# Patient Record
Sex: MALE | Race: WHITE | Employment: FULL TIME | URBAN - NONMETROPOLITAN AREA
[De-identification: names, ages, dates, MRNs, and addresses within clinical notes are randomized per-mention and may not be internally consistent; named-entity substitution may affect disease eponyms.]

---

## 2020-05-24 ENCOUNTER — HOSPITAL ENCOUNTER (EMERGENCY)
Facility: HOSPITAL | Age: 25
Discharge: HOME | End: 2020-05-24
Attending: EMERGENCY MEDICINE | Admitting: EMERGENCY MEDICINE
Payer: MEDICAID

## 2020-05-24 VITALS
SYSTOLIC BLOOD PRESSURE: 138 MMHG | TEMPERATURE: 97.6 F | RESPIRATION RATE: 20 BRPM | DIASTOLIC BLOOD PRESSURE: 94 MMHG | OXYGEN SATURATION: 98 %

## 2020-05-24 DIAGNOSIS — K08.89 PAIN, DENTAL: ICD-10-CM

## 2020-05-24 DIAGNOSIS — K02.9 DENTAL CARIES: ICD-10-CM

## 2020-05-24 DIAGNOSIS — K05.01 GINGIVITIS, ACUTE, NON-PLAQUE INDUCED: ICD-10-CM

## 2020-05-24 PROCEDURE — 25000125 ZZHC RX 250: Performed by: EMERGENCY MEDICINE

## 2020-05-24 PROCEDURE — 64400 NJX AA&/STRD TRIGEMINAL NRV: CPT | Performed by: EMERGENCY MEDICINE

## 2020-05-24 PROCEDURE — 99282 EMERGENCY DEPT VISIT SF MDM: CPT | Mod: 25 | Performed by: EMERGENCY MEDICINE

## 2020-05-24 PROCEDURE — 64400 NJX AA&/STRD TRIGEMINAL NRV: CPT

## 2020-05-24 PROCEDURE — 99283 EMERGENCY DEPT VISIT LOW MDM: CPT | Mod: 25

## 2020-05-24 PROCEDURE — 25000132 ZZH RX MED GY IP 250 OP 250 PS 637: Performed by: EMERGENCY MEDICINE

## 2020-05-24 PROCEDURE — 25000128 H RX IP 250 OP 636: Performed by: EMERGENCY MEDICINE

## 2020-05-24 RX ORDER — GABAPENTIN 300 MG/1
300 CAPSULE ORAL ONCE
Status: COMPLETED | OUTPATIENT
Start: 2020-05-24 | End: 2020-05-24

## 2020-05-24 RX ORDER — IBUPROFEN 600 MG/1
600 TABLET, FILM COATED ORAL EVERY 8 HOURS PRN
Qty: 60 TABLET | Refills: 0 | Status: SHIPPED | OUTPATIENT
Start: 2020-05-24 | End: 2020-06-13

## 2020-05-24 RX ORDER — IBUPROFEN 600 MG/1
600 TABLET, FILM COATED ORAL ONCE
Status: COMPLETED | OUTPATIENT
Start: 2020-05-24 | End: 2020-05-24

## 2020-05-24 RX ORDER — GABAPENTIN 300 MG/1
300 CAPSULE ORAL 3 TIMES DAILY
Qty: 30 CAPSULE | Refills: 0 | Status: SHIPPED | OUTPATIENT
Start: 2020-05-24 | End: 2020-06-13

## 2020-05-24 RX ORDER — METHYLCELLULOSE 4000CPS 30 %
POWDER (GRAM) MISCELLANEOUS ONCE
Status: DISCONTINUED | OUTPATIENT
Start: 2020-05-24 | End: 2020-05-24 | Stop reason: HOSPADM

## 2020-05-24 RX ORDER — BUPIVACAINE HYDROCHLORIDE AND EPINEPHRINE 2.5; 5 MG/ML; UG/ML
10 INJECTION, SOLUTION INFILTRATION; PERINEURAL ONCE
Status: DISCONTINUED | OUTPATIENT
Start: 2020-05-24 | End: 2020-05-24 | Stop reason: HOSPADM

## 2020-05-24 RX ORDER — AMOXICILLIN 875 MG
875 TABLET ORAL 2 TIMES DAILY
Qty: 14 TABLET | Refills: 0 | Status: SHIPPED | OUTPATIENT
Start: 2020-05-24 | End: 2020-06-13

## 2020-05-24 RX ADMIN — AMOXICILLIN AND CLAVULANATE POTASSIUM 1 TABLET: 875; 125 TABLET, FILM COATED ORAL at 02:24

## 2020-05-24 RX ADMIN — IBUPROFEN 600 MG: 600 TABLET ORAL at 02:23

## 2020-05-24 RX ADMIN — GABAPENTIN 300 MG: 300 CAPSULE ORAL at 02:24

## 2020-05-24 RX ADMIN — EPINEPHRINE BITARTRATE 5 ML: 1 POWDER at 02:23

## 2020-05-24 ASSESSMENT — ENCOUNTER SYMPTOMS
NAUSEA: 0
SORE THROAT: 0
SHORTNESS OF BREATH: 0
SINUS PAIN: 0
BRUISES/BLEEDS EASILY: 0
FEVER: 0
ADENOPATHY: 0
CHOKING: 0
FACIAL SWELLING: 1
VOICE CHANGE: 0
CHILLS: 0
TROUBLE SWALLOWING: 0

## 2020-05-24 NOTE — DISCHARGE INSTRUCTIONS
Followup with a dentist ASAP  and also get a PCP.  You can also call the [facility] at    To get a pcp  Take motrin with food.

## 2020-05-24 NOTE — ED TRIAGE NOTES
"Comes  To triage holding his right lower jaw with both hands and moaning. State he is having an issue with an incoming wisdom tooth pushing against \"my last molar\".  "

## 2020-05-24 NOTE — ED AVS SNAPSHOT
[facility]  [address]  [place] [address]  Phone:  [phone]                                    Eugene Fragoso   MRN: [id number]    Department:  [facility]   Date of Visit:  5/24/2020           After Visit Summary Signature Page    I have received my discharge instructions, and my questions have been answered. I have discussed any challenges I see with this plan with the nurse or doctor.    ..........................................................................................................................................  Patient/Patient Representative Signature      ..........................................................................................................................................  Patient Representative Print Name and Relationship to Patient    ..................................................               ................................................  Date                                   Time    ..........................................................................................................................................  Reviewed by Signature/Title    ...................................................              ..............................................  Date                                               Time          22EPIC Rev 08/18

## 2020-05-24 NOTE — ED PROVIDER NOTES
"  History     Chief Complaint   Patient presents with     Dental Pain     HPI  Eugene Fragoso is a 25 year old male who has a two day history of dental pain on the lower right side of his mouth. He thinks his wisdome tooth is coming in and pushing on his second molar on that side. He  Has not had this before. Cold things make it worse. He has not taken any other med than tylenol. He does smoke, denies  Drugs of abuse, but says he is \" not good about taking care of my teeth\"  He is not diabetic and  Has no fever but pos pain on chewing     Allergies:  No Known Allergies    Problem List:    There are no active problems to display for this patient.       Past Medical History:    History reviewed. No pertinent past medical history.    Past Surgical History:    History reviewed. No pertinent surgical history.    Family History:    No family history on file.    Social History:  Marital Status:  Single [1]  Social History     Tobacco Use     Smoking status: None   Substance Use Topics     Alcohol use: None     Drug use: None        Medications:    amoxicillin (AMOXIL) 875 MG tablet  gabapentin (NEURONTIN) 300 MG capsule  ibuprofen (ADVIL/MOTRIN) 600 MG tablet          Review of Systems   Constitutional: Negative for chills and fever.   HENT: Positive for facial swelling. Negative for hearing loss, nosebleeds, sinus pain, sore throat, trouble swallowing and voice change.    Respiratory: Negative for choking and shortness of breath.    Gastrointestinal: Negative for nausea.   Allergic/Immunologic: Negative for environmental allergies and immunocompromised state.   Hematological: Negative for adenopathy. Does not bruise/bleed easily.       Physical Exam   BP: 138/94  Heart Rate: 87  Temp: 97.6  F (36.4  C)  Resp: 20  SpO2: 98 %      Physical Exam  Constitutional:       General: He is not in acute distress.     Appearance: He is not toxic-appearing or diaphoretic.   HENT:      Head: Normocephalic and atraumatic.      " Right Ear: Ear canal normal.      Left Ear: Ear canal normal.      Nose: Nose normal.      Mouth/Throat:      Mouth: Mucous membranes are moist.      Pharynx: No oropharyngeal exudate or posterior oropharyngeal erythema.      Comments: Patient has tenderness on the lower angle of the jaw but not at the TMJ.  He can open his mouth fully.  There is some redness to the gingiva but no palpable pus.  He is little bit tender in the pterygoid fossa area.  His second molar has been temp on the posterior surface.  He is missing the first molar and several other teeth on the lower surfaces and several on the upper surfaces of his dental array.  He has multiple fillings in.  There are no cracked fillings or other evulsed teeth.  Odor on his breath is not of alcohol or ketones.  Eyes:      General:         Right eye: No discharge.         Left eye: No discharge.      Extraocular Movements: Extraocular movements intact.      Pupils: Pupils are equal, round, and reactive to light.   Neck:      Musculoskeletal: No neck rigidity or muscular tenderness.      Vascular: No carotid bruit.   Cardiovascular:      Rate and Rhythm: Normal rate and regular rhythm.      Heart sounds: Normal heart sounds.   Pulmonary:      Effort: No respiratory distress.      Breath sounds: Normal breath sounds. No wheezing or rales.   Chest:      Chest wall: No tenderness.   Abdominal:      General: Bowel sounds are normal.      Palpations: Abdomen is soft.      Tenderness: There is no abdominal tenderness.   Musculoskeletal: Normal range of motion.         General: No tenderness.      Cervical back: He exhibits no tenderness.      Thoracic back: He exhibits no tenderness.      Lumbar back: He exhibits no tenderness.   Skin:     Findings: No abrasion or laceration.   Neurological:      General: No focal deficit present.      Mental Status: He is alert and oriented to person, place, and time.      Cranial Nerves: No cranial nerve deficit.      Motor: No  weakness.   Psychiatric:         Behavior: Behavior normal.         Thought Content: Thought content normal.         ED Course        [facility]    Dental Block    Date/Time: 5/24/2020 2:25 AM  Performed by: Latasha Rivera MD  Authorized by: Latasha Rivera MD       INDICATIONS     Indications: dental abscess and dental pain      LOCATION     Block type:  Inferior alveolar    Laterality:  Right    PROCEDURE DETAILS     Topical anesthetic:  Benzocaine gel and lidocaine gel    Needle gauge:  27 G    Anesthetic injected:  Bupivacaine 0.5% WITH epi    Injection procedure:  Anatomic landmarks identified, introduced needle, incremental injection, anatomic landmarks palpated and negative aspiration for blood    POST PROCEDURE DETAILS      Outcome:  Pain improved    PROCEDURE   Patient Tolerance:  Patient tolerated the procedure well with no immediate complications                     No results found for this or any previous visit (from the past 24 hour(s)).    Medications   bupivacaine 0.25 % - EPINEPHrine 1:200,000 injection 10 mL (has no administration in time range)   lidocaine/EPINEPHrine/tetracaine (LET) solution KIT (has no administration in time range)   methylcellulose powder (has no administration in time range)   ibuprofen (ADVIL/MOTRIN) tablet 600 mg (has no administration in time range)   amoxicillin-clavulanate (AUGMENTIN) 875-125 MG per tablet 1 tablet (has no administration in time range)   gabapentin (NEURONTIN) capsule 300 mg (has no administration in time range)       Assessments & Plan (with Medical Decision Making)     I have reviewed the nursing notes.    I have reviewed the findings, diagnosis, plan and need for follow up with the patient.      New Prescriptions    AMOXICILLIN (AMOXIL) 875 MG TABLET    Take 1 tablet (875 mg) by mouth 2 times daily    GABAPENTIN (NEURONTIN) 300 MG CAPSULE    Take 1 capsule (300 mg) by mouth 3 times daily    IBUPROFEN (ADVIL/MOTRIN) 600 MG TABLET     Take 1 tablet (600 mg) by mouth every 8 hours as needed for moderate pain       Final diagnoses:   Pain, dental   Dental caries   Gingivitis, acute, non-plaque induced       5/24/2020   [facility]     Latasha Rivera MD  05/24/20 5539

## 2020-06-13 ENCOUNTER — HOSPITAL ENCOUNTER (EMERGENCY)
Facility: HOSPITAL | Age: 25
Discharge: HOME | End: 2020-06-13
Attending: NURSE PRACTITIONER | Admitting: NURSE PRACTITIONER
Payer: MEDICAID

## 2020-06-13 VITALS
DIASTOLIC BLOOD PRESSURE: 90 MMHG | SYSTOLIC BLOOD PRESSURE: 138 MMHG | OXYGEN SATURATION: 99 % | RESPIRATION RATE: 14 BRPM | TEMPERATURE: 97.4 F

## 2020-06-13 DIAGNOSIS — K04.7 DENTAL ABSCESS: ICD-10-CM

## 2020-06-13 PROCEDURE — G0463 HOSPITAL OUTPT CLINIC VISIT: HCPCS

## 2020-06-13 PROCEDURE — 99203 OFFICE O/P NEW LOW 30 MIN: CPT | Mod: Z6 | Performed by: NURSE PRACTITIONER

## 2020-06-13 RX ORDER — CLINDAMYCIN HCL 150 MG
450 CAPSULE ORAL 3 TIMES DAILY
Qty: 63 CAPSULE | Refills: 0 | Status: SHIPPED | OUTPATIENT
Start: 2020-06-13 | End: 2020-06-20

## 2020-06-13 RX ORDER — CHLORHEXIDINE GLUCONATE ORAL RINSE 1.2 MG/ML
15 SOLUTION DENTAL 2 TIMES DAILY
Qty: 300 ML | Refills: 0 | Status: SHIPPED | OUTPATIENT
Start: 2020-06-13 | End: 2020-06-23

## 2020-06-13 ASSESSMENT — ENCOUNTER SYMPTOMS
FACIAL SWELLING: 1
LIGHT-HEADEDNESS: 0
HEADACHES: 1
DIZZINESS: 0
NECK STIFFNESS: 0
FEVER: 0
VOMITING: 0
EYES NEGATIVE: 1
CHILLS: 1
ACTIVITY CHANGE: 1
NECK PAIN: 0
NAUSEA: 0

## 2020-06-13 NOTE — DISCHARGE INSTRUCTIONS
"clindamycin as prescribed for infection.  Apply a cold pack or ice compress for up to 20 minutes several times a day. This is to help reduce pain and relieve swelling. Cover it with a thin, dry cloth before putting it on your skin.    Rinse your mouth with warm saltwater several times per day. This will help reduce irritation, gum swelling, and pain.  Good oral hygiene is imperitive. Brush your at least twice a day. Use a fluoride toothpaste and soft-bristle toothbrush.  Eat a healthy diet that doesn t include many sugary foods and drinks.  Call ASAP to schedule an appointment to see a dentist.   Our  department can try to assist you if you are having difficulty finding a dentist, dental coverage, or paying for dental care.  You can reach them by calling [phone] and asking for .  Return to ED/UC if symptoms increase or concerns develop such as those discussed and listed on the \"When to go the Emergency Room\" portion of your discharge instructions.     Ibuprofen 600 to 800 mg (3 - 4 tabs of over the counter med) every six to eight hours as needed;not to exceed maximum amount of 3200 mg in 24 hours.Tylenol 650 to 1000 mg every four to six hours as needed (not to exceed more than 4000 mg in a 24 hour period). May use interchangeably. Suggest medicating around the clock for the next 24-48 hours. Take ibuprofen with food  "

## 2020-06-13 NOTE — ED PROVIDER NOTES
History     Chief Complaint   Patient presents with     Dental Pain     rt lower dental pain     HPI  Eugene Fragoso is a 25 year old male who presents with right lower dental pain and right sided facial swelling. He feels he has a wisdom tooth that is trying to come through. This is accompanied with right ear pain and headache. He took Tylenol once today with minimal relief. He was treated one month ago with antibiotics. Smoker. Denies fevers, chills, nausea, vomiting, diarrhea, and shortness of breath.    Allergies:  No Known Allergies    Problem List:    There are no active problems to display for this patient.       Past Medical History:    History reviewed. No pertinent past medical history.    Past Surgical History:    History reviewed. No pertinent surgical history.    Family History:    No family history on file.    Social History:  Marital Status:  Single [1]  Social History     Tobacco Use     Smoking status: None   Substance Use Topics     Alcohol use: None     Drug use: None        Medications:    chlorhexidine (PERIDEX) 0.12 % solution  clindamycin (CLEOCIN) 150 MG capsule          Review of Systems   Constitutional: Positive for activity change and chills. Negative for fever.   HENT: Positive for ear pain (right) and facial swelling.    Eyes: Negative.    Gastrointestinal: Negative for nausea and vomiting.   Musculoskeletal: Negative for neck pain and neck stiffness.   Neurological: Positive for headaches. Negative for dizziness and light-headedness.       Physical Exam   BP: 138/90  Heart Rate: 82  Temp: 97.4  F (36.3  C)  Resp: 14  SpO2: 99 %      Physical Exam  Vitals signs and nursing note reviewed.   Constitutional:       General: He is in acute distress.      Appearance: He is normal weight.   HENT:      Head:      Jaw: No trismus.        Right Ear: Ear canal normal. A middle ear effusion is present.      Mouth/Throat:      Dentition: Abnormal dentition. Dental tenderness, dental caries  and dental abscesses present.      Pharynx: Uvula midline. Posterior oropharyngeal erythema present.     Cardiovascular:      Rate and Rhythm: Normal rate.   Pulmonary:      Effort: Pulmonary effort is normal.   Lymphadenopathy:      Cervical: Cervical adenopathy present.      Right cervical: Superficial cervical adenopathy present.      Left cervical: Superficial cervical adenopathy present.   Neurological:      Mental Status: He is alert and oriented to person, place, and time.   Psychiatric:         Behavior: Behavior normal.         ED Course        Procedures           No results found for this or any previous visit (from the past 24 hour(s)).    Medications - No data to display    Assessments & Plan (with Medical Decision Making)     I have reviewed the nursing notes.    I have reviewed the findings, diagnosis, plan and need for follow up with the patient.  (K04.7) Dental abscess  Comment: 25 year old male who presents with right lower dental pain and right sided facial swelling. He feels he has a wisdom tooth that is trying to come through. This is accompanied with right ear pain and headache. He took Tylenol once today with minimal relief. He was treated one month ago with antibiotics. Smoker. Denies fevers, chills, nausea, vomiting, diarrhea, and shortness of breath.    Assessment: severe dental caries noted throughout his oral cavity. Tooth # 31  Erythema noted around gumline surrounding the tooth and into the buccal region.  Right middle ear effusion.    Plan: clindamycin and Peridex swish and spit. Education provided and discussed for this/these medications and for dental abscess with facial swelling.  Apply a cold pack or ice compress for up to 20 minutes several times a day. This is to help reduce pain and relieve swelling. Cover it with a thin, dry cloth before putting it on your skin.    Rinse your mouth with warm saltwater several times per day. This will help reduce irritation, gum swelling, and  "pain.  Good oral hygiene is imperitive. Brush your at least twice a day. Use a fluoride toothpaste and soft-bristle toothbrush.  Eat a healthy diet that doesn t include many sugary foods and drinks.  Call [facility] to schedule an appointment to see a dentist.   Our  department can try to assist you if you are having difficulty finding a dentist, dental coverage, or paying for dental care.  You can reach them by calling [phone] and asking for .  Return to ED/UC if symptoms increase or concerns develop such as those discussed and listed on the \"When to go the Emergency Room\" portion of your discharge instructions.     Ibuprofen 600 to 800 mg (3 - 4 tabs of over the counter med) every six to eight hours as needed;not to exceed maximum amount of 3200 mg in 24 hours.Tylenol 650 to 1000 mg every four to six hours as needed (not to exceed more than 4000 mg in a 24 hour period). May use interchangeably. Suggest medicating around the clock for the next 24-48 hours. Take ibuprofen with food  These discharge instructions and medications were reviewed with him and understanding verbalized.    Discharge Medication List as of 6/13/2020  3:56 PM      START taking these medications    Details   chlorhexidine (PERIDEX) 0.12 % solution Swish and spit 15 mLs in mouth 2 times daily for 10 days, Disp-300 mL,R-0, E-Prescribe      clindamycin (CLEOCIN) 150 MG capsule Take 3 capsules (450 mg) by mouth 3 times daily for 7 days, Disp-63 capsule,R-0, E-Prescribe             Final diagnoses:   Dental abscess       6/13/2020   [facility]       Radha Croft, CNP  06/14/20 1613    "

## 2020-06-13 NOTE — ED TRIAGE NOTES
R lower dental pain. States he has a wisdom tooth growing in, along with bad teeth on that side. States does not have insurance so he does not have a dentist. Has not tried getting into any sliding scale dental offices.

## 2020-06-13 NOTE — ED AVS SNAPSHOT
[facility]  [address]  [place] [address]  Phone:  [phone]                                    Eugene Fragoso   MRN: [id number]    Department:  [facility]   Date of Visit:  6/13/2020           After Visit Summary Signature Page    I have received my discharge instructions, and my questions have been answered. I have discussed any challenges I see with this plan with the nurse or doctor.    ..........................................................................................................................................  Patient/Patient Representative Signature      ..........................................................................................................................................  Patient Representative Print Name and Relationship to Patient    ..................................................               ................................................  Date                                   Time    ..........................................................................................................................................  Reviewed by Signature/Title    ...................................................              ..............................................  Date                                               Time          22EPIC Rev 08/18